# Patient Record
Sex: FEMALE | Race: BLACK OR AFRICAN AMERICAN | NOT HISPANIC OR LATINO | Employment: OTHER | URBAN - NONMETROPOLITAN AREA
[De-identification: names, ages, dates, MRNs, and addresses within clinical notes are randomized per-mention and may not be internally consistent; named-entity substitution may affect disease eponyms.]

---

## 2021-01-01 ENCOUNTER — TELEPHONE (OUTPATIENT)
Dept: FAMILY MEDICINE | Facility: CLINIC | Age: 86
End: 2021-01-01

## 2021-01-01 ENCOUNTER — OFFICE VISIT (OUTPATIENT)
Dept: FAMILY MEDICINE | Facility: CLINIC | Age: 86
End: 2021-01-01
Payer: MEDICARE

## 2021-01-01 ENCOUNTER — HOSPITAL ENCOUNTER (EMERGENCY)
Facility: HOSPITAL | Age: 86
Discharge: HOME | End: 2021-05-28
Payer: MEDICARE

## 2021-01-01 VITALS
SYSTOLIC BLOOD PRESSURE: 130 MMHG | RESPIRATION RATE: 18 BRPM | TEMPERATURE: 98 F | RESPIRATION RATE: 18 BRPM | SYSTOLIC BLOOD PRESSURE: 134 MMHG | HEIGHT: 64 IN | HEART RATE: 67 BPM | HEART RATE: 73 BPM | HEIGHT: 66 IN | DIASTOLIC BLOOD PRESSURE: 62 MMHG | WEIGHT: 180 LBS | DIASTOLIC BLOOD PRESSURE: 46 MMHG | OXYGEN SATURATION: 98 % | BODY MASS INDEX: 28.93 KG/M2 | OXYGEN SATURATION: 98 %

## 2021-01-01 DIAGNOSIS — M17.11 PRIMARY OSTEOARTHRITIS OF RIGHT KNEE: ICD-10-CM

## 2021-01-01 DIAGNOSIS — I10 HYPERTENSION: ICD-10-CM

## 2021-01-01 DIAGNOSIS — I10 HYPERTENSION, UNSPECIFIED TYPE: ICD-10-CM

## 2021-01-01 DIAGNOSIS — S89.91XA RIGHT KNEE INJURY: ICD-10-CM

## 2021-01-01 DIAGNOSIS — E78.5 HYPERLIPIDEMIA, UNSPECIFIED HYPERLIPIDEMIA TYPE: ICD-10-CM

## 2021-01-01 DIAGNOSIS — S89.91XA RIGHT LEG INJURY: ICD-10-CM

## 2021-01-01 DIAGNOSIS — L03.115 CELLULITIS OF RIGHT LOWER EXTREMITY: Primary | ICD-10-CM

## 2021-01-01 DIAGNOSIS — E78.5 HYPERLIPIDEMIA: ICD-10-CM

## 2021-01-01 DIAGNOSIS — M79.604 RIGHT LEG PAIN: ICD-10-CM

## 2021-01-01 DIAGNOSIS — S80.11XA CONTUSION OF RIGHT LOWER EXTREMITY, INITIAL ENCOUNTER: ICD-10-CM

## 2021-01-01 DIAGNOSIS — M17.11 PRIMARY OSTEOARTHRITIS OF RIGHT KNEE: Primary | ICD-10-CM

## 2021-01-01 LAB
ANION GAP SERPL CALCULATED.3IONS-SCNC: 13 MMOL/L (ref 7–16)
BASOPHILS # BLD AUTO: 0.06 K/UL (ref 0–0.2)
BASOPHILS NFR BLD AUTO: 1.1 % (ref 0–1)
BUN SERPL-MCNC: 22 MG/DL (ref 7–18)
BUN/CREAT SERPL: 20 (ref 6–20)
CALCIUM SERPL-MCNC: 8.6 MG/DL (ref 8.5–10.1)
CHLORIDE SERPL-SCNC: 110 MMOL/L (ref 98–107)
CO2 SERPL-SCNC: 26 MMOL/L (ref 21–32)
CREAT SERPL-MCNC: 1.08 MG/DL (ref 0.55–1.02)
DIFFERENTIAL METHOD BLD: ABNORMAL
EOSINOPHIL # BLD AUTO: 0.16 K/UL (ref 0–0.5)
EOSINOPHIL NFR BLD AUTO: 2.8 % (ref 1–4)
ERYTHROCYTE [DISTWIDTH] IN BLOOD BY AUTOMATED COUNT: 15.5 % (ref 11.5–14.5)
GLUCOSE SERPL-MCNC: 98 MG/DL (ref 74–106)
HCT VFR BLD AUTO: 31.9 % (ref 38–47)
HGB BLD-MCNC: 10.5 G/DL (ref 12–16)
IMM GRANULOCYTES # BLD AUTO: 0.03 K/UL (ref 0–0.04)
IMM GRANULOCYTES NFR BLD: 0.5 % (ref 0–0.4)
LYMPHOCYTES # BLD AUTO: 2.44 K/UL (ref 1–4.8)
LYMPHOCYTES NFR BLD AUTO: 43.3 % (ref 27–41)
MCH RBC QN AUTO: 29.5 PG (ref 27–31)
MCHC RBC AUTO-ENTMCNC: 32.9 G/DL (ref 32–36)
MCV RBC AUTO: 89.6 FL (ref 80–96)
MONOCYTES # BLD AUTO: 0.6 K/UL (ref 0–0.8)
MONOCYTES NFR BLD AUTO: 10.6 % (ref 2–6)
MPC BLD CALC-MCNC: 9.9 FL (ref 9.4–12.4)
NEUTROPHILS # BLD AUTO: 2.35 K/UL (ref 1.8–7.7)
NEUTROPHILS NFR BLD AUTO: 41.7 % (ref 53–65)
NRBC # BLD AUTO: 0 X10E3/UL
NRBC, AUTO (.00): 0 %
PLATELET # BLD AUTO: 293 K/UL (ref 150–400)
POTASSIUM SERPL-SCNC: 4.6 MMOL/L (ref 3.5–5.1)
RBC # BLD AUTO: 3.56 M/UL (ref 4.2–5.4)
SODIUM SERPL-SCNC: 144 MMOL/L (ref 136–145)
WBC # BLD AUTO: 5.64 K/UL (ref 4.5–11)

## 2021-01-01 PROCEDURE — 85025 COMPLETE CBC W/AUTO DIFF WBC: CPT | Performed by: NURSE PRACTITIONER

## 2021-01-01 PROCEDURE — 3288F PR FALLS RISK ASSESSMENT DOCUMENTED: ICD-10-PCS | Mod: CPTII,,, | Performed by: FAMILY MEDICINE

## 2021-01-01 PROCEDURE — 36415 COLL VENOUS BLD VENIPUNCTURE: CPT | Performed by: NURSE PRACTITIONER

## 2021-01-01 PROCEDURE — 80048 BASIC METABOLIC PNL TOTAL CA: CPT | Performed by: NURSE PRACTITIONER

## 2021-01-01 PROCEDURE — 99213 PR OFFICE/OUTPT VISIT, EST, LEVL III, 20-29 MIN: ICD-10-PCS | Mod: ,,, | Performed by: FAMILY MEDICINE

## 2021-01-01 PROCEDURE — 99213 OFFICE O/P EST LOW 20 MIN: CPT | Mod: ,,, | Performed by: FAMILY MEDICINE

## 2021-01-01 PROCEDURE — 1101F PT FALLS ASSESS-DOCD LE1/YR: CPT | Mod: CPTII,,, | Performed by: FAMILY MEDICINE

## 2021-01-01 PROCEDURE — 1101F PR PT FALLS ASSESS DOC 0-1 FALLS W/OUT INJ PAST YR: ICD-10-PCS | Mod: CPTII,,, | Performed by: FAMILY MEDICINE

## 2021-01-01 PROCEDURE — 99283 PR EMERGENCY DEPT VISIT,LEVEL III: ICD-10-PCS | Mod: ,,, | Performed by: NURSE PRACTITIONER

## 2021-01-01 PROCEDURE — 3288F FALL RISK ASSESSMENT DOCD: CPT | Mod: CPTII,,, | Performed by: FAMILY MEDICINE

## 2021-01-01 PROCEDURE — 1159F PR MEDICATION LIST DOCUMENTED IN MEDICAL RECORD: ICD-10-PCS | Mod: ,,, | Performed by: FAMILY MEDICINE

## 2021-01-01 PROCEDURE — 96365 THER/PROPH/DIAG IV INF INIT: CPT

## 2021-01-01 PROCEDURE — 99283 EMERGENCY DEPT VISIT LOW MDM: CPT | Mod: ,,, | Performed by: NURSE PRACTITIONER

## 2021-01-01 PROCEDURE — 99284 EMERGENCY DEPT VISIT MOD MDM: CPT | Mod: 25

## 2021-01-01 PROCEDURE — 25000003 PHARM REV CODE 250: Performed by: NURSE PRACTITIONER

## 2021-01-01 PROCEDURE — 1159F MED LIST DOCD IN RCRD: CPT | Mod: ,,, | Performed by: FAMILY MEDICINE

## 2021-01-01 RX ORDER — DICLOFENAC SODIUM 10 MG/G
2 GEL TOPICAL DAILY
Qty: 300 G | Refills: 1 | Status: SHIPPED | OUTPATIENT
Start: 2021-01-01

## 2021-01-01 RX ORDER — EZETIMIBE 10 MG/1
10 TABLET ORAL DAILY
COMMUNITY
Start: 2021-04-21

## 2021-01-01 RX ORDER — AMLODIPINE AND BENAZEPRIL HYDROCHLORIDE 5; 10 MG/1; MG/1
1 CAPSULE ORAL DAILY
COMMUNITY
Start: 2021-01-01

## 2021-01-01 RX ORDER — CLINDAMYCIN HYDROCHLORIDE 150 MG/1
300 CAPSULE ORAL EVERY 8 HOURS
Qty: 60 CAPSULE | Refills: 0 | Status: SHIPPED | OUTPATIENT
Start: 2021-01-01 | End: 2021-01-01

## 2021-01-01 RX ORDER — CLINDAMYCIN PHOSPHATE 900 MG/50ML
900 INJECTION, SOLUTION INTRAVENOUS
Status: COMPLETED | OUTPATIENT
Start: 2021-01-01 | End: 2021-01-01

## 2021-01-01 RX ADMIN — CLINDAMYCIN IN 5 PERCENT DEXTROSE 900 MG: 18 INJECTION, SOLUTION INTRAVENOUS at 11:05

## 2021-05-20 PROBLEM — E78.5 HYPERLIPIDEMIA: Status: ACTIVE | Noted: 2021-01-01

## 2021-05-20 PROBLEM — I10 HYPERTENSION: Status: ACTIVE | Noted: 2021-01-01

## 2021-05-20 PROBLEM — M17.11 PRIMARY OSTEOARTHRITIS OF RIGHT KNEE: Status: ACTIVE | Noted: 2021-01-01

## 2022-01-01 ENCOUNTER — OFFICE VISIT (OUTPATIENT)
Dept: WOUND CARE | Facility: CLINIC | Age: 87
End: 2022-01-01
Payer: MEDICARE

## 2022-01-01 ENCOUNTER — OFFICE VISIT (OUTPATIENT)
Dept: WOUND CARE | Facility: CLINIC | Age: 87
End: 2022-01-01
Attending: NURSE PRACTITIONER
Payer: MEDICARE

## 2022-01-01 VITALS
HEIGHT: 66 IN | TEMPERATURE: 97 F | DIASTOLIC BLOOD PRESSURE: 52 MMHG | HEART RATE: 75 BPM | BODY MASS INDEX: 27.97 KG/M2 | SYSTOLIC BLOOD PRESSURE: 106 MMHG | WEIGHT: 174 LBS | RESPIRATION RATE: 20 BRPM

## 2022-01-01 VITALS
RESPIRATION RATE: 20 BRPM | SYSTOLIC BLOOD PRESSURE: 120 MMHG | DIASTOLIC BLOOD PRESSURE: 53 MMHG | HEART RATE: 78 BPM | TEMPERATURE: 98 F

## 2022-01-01 DIAGNOSIS — L89.624 PRESSURE INJURY OF LEFT HEEL, STAGE 4: Primary | ICD-10-CM

## 2022-01-01 DIAGNOSIS — L89.212: ICD-10-CM

## 2022-01-01 DIAGNOSIS — L89.893 PRESSURE INJURY OF RIGHT LEG, STAGE 3: ICD-10-CM

## 2022-01-01 LAB
BACTERIA SPEC ANAEROBE CULT: ABNORMAL
MICROORGANISM SPEC CULT: ABNORMAL

## 2022-01-01 PROCEDURE — 87077 CULTURE, WOUND: ICD-10-PCS | Mod: GW,ICN,, | Performed by: CLINICAL MEDICAL LABORATORY

## 2022-01-01 PROCEDURE — 1159F MED LIST DOCD IN RCRD: CPT | Mod: CPTII,,, | Performed by: NURSE PRACTITIONER

## 2022-01-01 PROCEDURE — 1125F AMNT PAIN NOTED PAIN PRSNT: CPT | Mod: CPTII,,, | Performed by: NURSE PRACTITIONER

## 2022-01-01 PROCEDURE — 1160F RVW MEDS BY RX/DR IN RCRD: CPT | Mod: CPTII,,, | Performed by: NURSE PRACTITIONER

## 2022-01-01 PROCEDURE — 1125F PR PAIN SEVERITY QUANTIFIED, PAIN PRESENT: ICD-10-PCS | Mod: CPTII,,, | Performed by: NURSE PRACTITIONER

## 2022-01-01 PROCEDURE — 87070 CULTURE, WOUND: ICD-10-PCS | Mod: GW,ICN,, | Performed by: CLINICAL MEDICAL LABORATORY

## 2022-01-01 PROCEDURE — 87070 CULTURE OTHR SPECIMN AEROBIC: CPT | Mod: GW,ICN,, | Performed by: CLINICAL MEDICAL LABORATORY

## 2022-01-01 PROCEDURE — 1160F PR REVIEW ALL MEDS BY PRESCRIBER/CLIN PHARMACIST DOCUMENTED: ICD-10-PCS | Mod: CPTII,,, | Performed by: NURSE PRACTITIONER

## 2022-01-01 PROCEDURE — 87076 CULTURE ANAEROBE IDENT EACH: CPT | Mod: GW,ICN,, | Performed by: CLINICAL MEDICAL LABORATORY

## 2022-01-01 PROCEDURE — 99499 UNLISTED E&M SERVICE: CPT | Mod: S$PBB,,, | Performed by: NURSE PRACTITIONER

## 2022-01-01 PROCEDURE — 87186 CULTURE, WOUND: ICD-10-PCS | Performed by: CLINICAL MEDICAL LABORATORY

## 2022-01-01 PROCEDURE — 87075 CULTURE, ANAEROBE: ICD-10-PCS | Mod: GW,ICN,, | Performed by: CLINICAL MEDICAL LABORATORY

## 2022-01-01 PROCEDURE — 87076 CULTURE, ANAEROBE: ICD-10-PCS | Mod: GW,ICN,, | Performed by: CLINICAL MEDICAL LABORATORY

## 2022-01-01 PROCEDURE — 1159F PR MEDICATION LIST DOCUMENTED IN MEDICAL RECORD: ICD-10-PCS | Mod: CPTII,,, | Performed by: NURSE PRACTITIONER

## 2022-01-01 PROCEDURE — 99214 OFFICE O/P EST MOD 30 MIN: CPT | Mod: S$PBB,,, | Performed by: NURSE PRACTITIONER

## 2022-01-01 PROCEDURE — 11042 DBRDMT SUBQ TIS 1ST 20SQCM/<: CPT | Mod: S$PBB,GW,, | Performed by: NURSE PRACTITIONER

## 2022-01-01 PROCEDURE — 11042 PR DEBRIDEMENT, SKIN, SUB-Q TISSUE,=<20 SQ CM: ICD-10-PCS | Mod: S$PBB,GW,, | Performed by: NURSE PRACTITIONER

## 2022-01-01 PROCEDURE — 87077 CULTURE AEROBIC IDENTIFY: CPT | Mod: GW,ICN,, | Performed by: CLINICAL MEDICAL LABORATORY

## 2022-01-01 PROCEDURE — 99214 PR OFFICE/OUTPT VISIT, EST, LEVL IV, 30-39 MIN: ICD-10-PCS | Mod: S$PBB,,, | Performed by: NURSE PRACTITIONER

## 2022-01-01 PROCEDURE — 11042 DBRDMT SUBQ TIS 1ST 20SQCM/<: CPT | Mod: PBBFAC | Performed by: NURSE PRACTITIONER

## 2022-01-01 PROCEDURE — 99499 NO LOS: ICD-10-PCS | Mod: S$PBB,,, | Performed by: NURSE PRACTITIONER

## 2022-01-01 PROCEDURE — 99214 OFFICE O/P EST MOD 30 MIN: CPT | Mod: PBBFAC | Performed by: NURSE PRACTITIONER

## 2022-01-01 PROCEDURE — 87075 CULTR BACTERIA EXCEPT BLOOD: CPT | Mod: GW,ICN,, | Performed by: CLINICAL MEDICAL LABORATORY

## 2022-01-01 PROCEDURE — 87186 SC STD MICRODIL/AGAR DIL: CPT | Mod: GW,ICN,, | Performed by: CLINICAL MEDICAL LABORATORY

## 2022-01-01 RX ORDER — GUAIFENESIN/DEXTROMETHORPHAN 100-10MG/5
20 SYRUP ORAL EVERY 4 HOURS PRN
COMMUNITY
Start: 2022-01-01

## 2022-01-01 RX ORDER — SULFAMETHOXAZOLE AND TRIMETHOPRIM 800; 160 MG/1; MG/1
1 TABLET ORAL 2 TIMES DAILY
Qty: 28 TABLET | Refills: 0 | Status: SHIPPED | OUTPATIENT
Start: 2022-01-01

## 2022-01-01 RX ORDER — COLLAGENASE SANTYL 250 [ARB'U]/G
OINTMENT TOPICAL DAILY
Qty: 90 G | Refills: 0 | Status: SHIPPED | OUTPATIENT
Start: 2022-01-01

## 2022-01-01 RX ORDER — ASCORBIC ACID 1000 MG
30-60 TABLET ORAL DAILY PRN
COMMUNITY
Start: 2022-01-01

## 2022-01-01 RX ORDER — AMOXICILLIN 500 MG/1
500 TABLET, FILM COATED ORAL EVERY 12 HOURS
Qty: 28 TABLET | Refills: 0 | Status: SHIPPED | OUTPATIENT
Start: 2022-01-01 | End: 2022-01-01

## 2022-01-01 RX ORDER — HYDROCODONE BITARTRATE AND ACETAMINOPHEN 7.5; 325 MG/1; MG/1
1 TABLET ORAL EVERY 4 HOURS PRN
COMMUNITY
Start: 2022-01-01

## 2022-01-01 RX ORDER — SULFAMETHOXAZOLE AND TRIMETHOPRIM 800; 160 MG/1; MG/1
1 TABLET ORAL 2 TIMES DAILY
Qty: 20 TABLET | Refills: 0 | Status: SHIPPED | OUTPATIENT
Start: 2022-01-01 | End: 2022-01-01

## 2022-02-15 NOTE — PATIENT INSTRUCTIONS
Clean wound with baby shampoo and water.    Apply Santyl ointment to wound beds, sensicare to periwounds    Cover with 4x4s, ABDs, julissa to legs and left heel, secure with paper tape    Change daily and as needed    Right hip apply maxsorb, cover with foam border, changed every 2-3 days    Elevate legs on pillows or apply heel protectors    Turn every 2 hours per protocol    Bactrim DS by mouth twice a day # 24 tablets      Patient Education       Wound Care Discharge Instructions   About this topic   A wound is an injury to the skin that breaks the barrier to the outside. The skin protects the inside of the body from the outside world. When the skin is damaged or broken open, the wound can become infected or bleed.  Cuts and scrapes are one type of wound. You may also hear these called abrasions. Scrapes on the surface leave deeper skin layers in place. These are often caused by friction or rubbing against a rough surface.  Another kind is a puncture wound. This comes from something like a bite or stepping on a nail. Puncture wounds are caused by a pointed or sharp object, such as a nail, needle, or tooth entering the skin. Bleeding can be very little, and the wound may be barely obvious. Bites from a human or an animal always have germs in them and need extra care.  A laceration is a cut on your skin. It is most often caused by a sharp object like a knife blade, glass, or from other things with sharp edges. If the cut is shallow, it does not need stitches.     What care is needed at home?   · Ask your doctor what you need to do when you go home. Make sure you ask questions if you do not understand what the doctor says.  · The doctor may want you to keep your wound covered as it heals. You may want to use a thin layer of antibiotic ointment to help keep the wound moist. This will also keep the dressing from sticking to the wound.  · After 24 hours, you can gently wash the wound with soap and water. Pat dry and  put on a clean dressing. A telfa pad will not stick to the wound.  · Change your dressing once a day or every other day.  · Always wash your hands before and after touching the wound.  · Each time you change the dressing, look closely at the wound to be sure it is healing the right way. The wound may have a yellowish discharge, and this is normal.  · Avoid picking the scab or scratching the site which may cause more irritation.  · Do not soak in water or swim with an open wound.  · Do not rub the wound or take off any small strips of tape.  What follow-up care is needed?   Your doctor may ask you to make visits to the office to check on your progress. Be sure to keep these visits.  What drugs may be needed?   The doctor may order drugs to:  · Help with pain  · Prevent or fight an infection  You may need to have a tetanus shot.  Will physical activity be limited?   You may need to limit your activity until your wound is fully healed. Talk to your doctor about the right amount of activity for you or when it is safe to return to sports.  What problems could happen?   · Bleeding  · Infection  · Scarring  · Poor healing  When do I need to call the doctor?   · Signs of infection. These include a fever of 100.4°F (38°C) or higher, chills, or wound that will not heal.  · The pain in and around the area gets much worse.  · There is a bad smell or pus (thick yellow, green, or gray fluid) coming from your wound.  · You notice a crunchy feeling or blisters in the skin around the wound.  · The redness around your wound gets bigger or is spreading up your arm or leg.  · Fluid that is not pus drains from your wound.  · Your swelling doesnt improve or gets worse.  Teach Back: Helping You Understand   The Teach Back Method helps you understand the information we are giving you. After you talk with the staff, tell them in your own words what you learned. This helps to make sure the staff has described each thing clearly. It also  helps to explain things that may have been confusing. Before going home, make sure you can do these:  · I can tell you about my wound.  · I can tell you how to care for my wound.  · I can tell you what I will do if I have swelling, redness, or warmth around my wound.  Where can I learn more?   American Academy of Pediatrics  https://www.healthychildren.org/English/health-issues/injuries-emergencies/Pages/Treating-Cuts.aspx   [employer] [facility]  https://www.betterhealth.[place] maria.gov.au/health/conditionsandtreatments/skin-cuts-and-abrasions   Kids [employer]  http://kidshealth.org/en/teens/wounds.html?ref=search   Last Reviewed Date   2021-10-08  Consumer Information Use and Disclaimer   This information is not specific medical advice and does not replace information you receive from your health care provider. This is only a brief summary of general information. It does NOT include all information about conditions, illnesses, injuries, tests, procedures, treatments, therapies, discharge instructions or life-style choices that may apply to you. You must talk with your health care provider for complete information about your health and treatment options. This information should not be used to decide whether or not to accept your health care providers advice, instructions or recommendations. Only your health care provider has the knowledge and training to provide advice that is right for you.  Copyright   Copyright © 2021 [employer] and its affiliates and/or licensors. All rights reserved.

## 2022-02-15 NOTE — PROGRESS NOTES
[facility]   [facility] - WOUND CARE  [address] [facility] [id number]  [phone]      PATIENT NAME: Namrata Patino  : 1932  DATE: 2/15/22  MRN: [id number]      Billing Provider: LA[facility]  Level of Service:   Patient PCP Information     Provider PCP Type    [facility]          Reason for Visit / Chief Complaint: Pressure Ulcer (Left heel, right hip)       History of Present Illness / Problem Focused Workflow     Namrata Patino is a 89 y.o. female who presents to clinic as a new patient with complaints of chronic-non healing wounds on the left heel, right lateral and medial lower leg, and redness to hip and around breast. Patient is on Hospice and current treatment has been protective ointment and medihoney.  Pertinent factors that delay wound healing include immobility, multiple co-morbidities, poor vascular supply, decreased granulation tissue, necrosis, advanced age, fragile skin and no protective sensation. Wound on left heel has necrotic tissue and moist slough with strong odor noted from wound. Denies fever or chills.       Review of Systems     Review of Systems   Constitutional: Negative for activity change, chills and fever.   Respiratory: Negative for chest tightness and shortness of breath.    Cardiovascular: Negative for chest pain and palpitations.   Musculoskeletal: Positive for arthralgias and joint swelling.   Skin: Positive for wound.        See wound assessment   Neurological: Positive for weakness and numbness.   Psychiatric/Behavioral: Positive for confusion. Negative for agitation, behavioral problems and self-injury.       Medical / Social / Family History     Past Medical History:   Diagnosis Date    CVA (cerebral vascular accident)     several years ago    Hypertension     Loss of appetite        History reviewed. No pertinent surgical history.    Social History  Ms. Namrata Patino  reports  that she has quit smoking. Her smoking use included cigarettes. She has never used smokeless tobacco. She reports previous alcohol use. She reports that she does not use drugs.    Family History  Ms.'s Namrata Patino family history is not on file.    Medications and Allergies     Medications  No outpatient medications have been marked as taking for the 2/15/22 encounter (Office Visit) with [facility].       Allergies  Review of patient's allergies indicates:   Allergen Reactions    Keflex [cephalexin] Hives       Physical Examination     Vitals:    02/15/22 0925   BP: (!) 106/52   Pulse: 75   Resp: 20   Temp: 96.6 °F (35.9 °C)     Physical Exam  Vitals and nursing note reviewed.   Constitutional:       Appearance: Normal appearance.   HENT:      Head: Normocephalic.   Cardiovascular:      Rate and Rhythm: Normal rate and regular rhythm.      Pulses: Normal pulses.      Heart sounds: Normal heart sounds.   Pulmonary:      Effort: Pulmonary effort is normal. No respiratory distress.   Chest:      Chest wall: No tenderness.   Musculoskeletal:         General: Swelling and tenderness present.      Left lower leg: Edema present.   Skin:     General: Skin is warm and dry.      Capillary Refill: Capillary refill takes 2 to 3 seconds.      Findings: Erythema present.      Comments: See wound assessment    Neurological:      Mental Status: She is alert. Mental status is at baseline.   Psychiatric:         Mood and Affect: Mood normal.         Behavior: Behavior normal.         Thought Content: Thought content normal.         Judgment: Judgment normal.         Assessment and Plan      1. Pressure injury of left heel, stage 4    2. Pressure injury of right leg, stage 3    3. Pressure injury of trochanteric region of right hip, stage 2           Altered Skin Integrity 02/15/22 0958 Left Heel #1  (Active)   02/15/22 0958   Altered Skin Integrity Present on Admission: yes   Side: Left   Orientation:    Location:  Heel   Wound Number: #1   Is this injury device related?: No   Primary Wound Type:    Description of Altered Skin Integrity:    Removal Indication and Assessment:    Wound Outcome:    (Retired) Wound Length (cm):    (Retired) Wound Width (cm):    (Retired) Depth (cm):    Wound Description (Comments):    Removal Indications:    Description of Altered Skin Integrity Full thickness tissue loss with exposed bone, tendon, or muscle. Often includes undermining and tunneling. May extend into muscle and/or supporting structures. 02/15/22 0957   Dressing Appearance Dry;Intact;Dried drainage 02/15/22 0957   Drainage Amount Moderate 02/15/22 0957   Drainage Characteristics/Odor Yellow;Tan 02/15/22 0957   Appearance Pink;Red;Necrotic;Eschar;Slough;Granulating;Adipose;Muscle 02/15/22 0957   Black (%), Wound Tissue Color 5 % 02/15/22 0957   Red (%), Wound Tissue Color 50 % 02/15/22 0957   Yellow (%), Wound Tissue Color 45 % 02/15/22 0957   Periwound Area Edematous;Moist;Indurated 02/15/22 0957   Wound Edges Open 02/15/22 0957   Wound Length (cm) 3.2 cm 02/15/22 0957   Wound Width (cm) 1.8 cm 02/15/22 0957   Wound Depth (cm) 0.3 cm 02/15/22 0957   Wound Volume (cm^3) 1.728 cm^3 02/15/22 0957   Wound Surface Area (cm^2) 5.76 cm^2 02/15/22 0957   Care Cleansed with:;Antimicrobial agent 02/15/22 0957   Dressing Applied;Silver;Gauze;Rolled gauze 02/15/22 0957   Periwound Care Moisture barrier applied 02/15/22 0957            Altered Skin Integrity 02/15/22 1002 Right lower;medial Leg #2 (Active)   02/15/22 1002   Altered Skin Integrity Present on Admission: yes   Side: Right   Orientation: lower;medial   Location: Leg   Wound Number: #2   Is this injury device related?:    Primary Wound Type:    Description of Altered Skin Integrity:    Removal Indication and Assessment:    Wound Outcome:    (Retired) Wound Length (cm):    (Retired) Wound Width (cm):    (Retired) Depth (cm):    Wound Description (Comments):    Removal Indications:     Wound Image    02/15/22 1001   Description of Altered Skin Integrity Full thickness tissue loss. Subcutaneous fat may be visible but bone, tendon or muscle are not exposed 02/15/22 1001   Dressing Appearance Dry;Intact;Clean 02/15/22 1001   Drainage Amount Moderate 02/15/22 1001   Drainage Characteristics/Odor Yellow 02/15/22 1001   Appearance Intact;Pink;Slough;Moist 02/15/22 1001   Black (%), Wound Tissue Color 0 % 02/15/22 1001   Red (%), Wound Tissue Color 90 % 02/15/22 1001   Yellow (%), Wound Tissue Color 10 % 02/15/22 1001   Periwound Area Intact;[place]illager 02/15/22 1001   Wound Edges Defined 02/15/22 1001   Wound Length (cm) 7 cm 02/15/22 1001   Wound Width (cm) 2 cm 02/15/22 1001   Wound Depth (cm) 0.3 cm 02/15/22 1001   Wound Volume (cm^3) 4.2 cm^3 02/15/22 1001   Wound Surface Area (cm^2) 14 cm^2 02/15/22 1001   Care Cleansed with:;Antimicrobial agent 02/15/22 1001   Dressing Applied;Silver;Gauze;Rolled gauze 02/15/22 1001            Altered Skin Integrity 02/15/22 1006 Right lower;medial Leg #3  Full thickness tissue loss. Subcutaneous fat may be visible but bone, tendon or muscle are not exposed (Active)   02/15/22 1006   Altered Skin Integrity Present on Admission: yes   Side: Right   Orientation: lower;medial   Location: Leg   Wound Number: #3   Is this injury device related?: No   Primary Wound Type:    Description of Altered Skin Integrity: Full thickness tissue loss. Subcutaneous fat may be visible but bone, tendon or muscle are not exposed   Removal Indication and Assessment:    Wound Outcome:    (Retired) Wound Length (cm):    (Retired) Wound Width (cm):    (Retired) Depth (cm):    Wound Description (Comments):    Removal Indications:    Description of Altered Skin Integrity Full thickness tissue loss. Subcutaneous fat may be visible but bone, tendon or muscle are not exposed 02/15/22 1006   Dressing Appearance Dry;Intact;Clean 02/15/22 1006   Drainage Amount Moderate 02/15/22 1006   Drainage  Characteristics/Odor Yellow 02/15/22 1006   Appearance Intact;Pink;Red;Slough 02/15/22 1006   Black (%), Wound Tissue Color 0 % 02/15/22 1006   Red (%), Wound Tissue Color 85 % 02/15/22 1006   Yellow (%), Wound Tissue Color 15 % 02/15/22 1006   Periwound Area Intact;[place] 02/15/22 1006   Wound Edges Defined 02/15/22 1006   Wound Length (cm) 1.9 cm 02/15/22 1006   Wound Width (cm) 0.7 cm 02/15/22 1006   Wound Depth (cm) 0.2 cm 02/15/22 1006   Wound Volume (cm^3) 0.266 cm^3 02/15/22 1006   Wound Surface Area (cm^2) 1.33 cm^2 02/15/22 1006   Care Cleansed with:;Antimicrobial agent 02/15/22 1006   Dressing Applied;Silver;Gauze;Rolled gauze 02/15/22 1006   Periwound Care Moisture barrier applied 02/15/22 1006            Altered Skin Integrity 02/15/22 1011 Right Greater trochanter #4  Partial thickness tissue loss. Shallow open ulcer with a red or pink wound bed, without slough. Intact or Open/Ruptured Serum-filled blister. (Active)   02/15/22 1011   Altered Skin Integrity Present on Admission: yes   Side: Right   Orientation:    Location: Greater trochanter   Wound Number: #4   Is this injury device related?: No   Primary Wound Type:    Description of Altered Skin Integrity: Partial thickness tissue loss. Shallow open ulcer with a red or pink wound bed, without slough. Intact or Open/Ruptured Serum-filled blister.   Removal Indication and Assessment:    Wound Outcome:    (Retired) Wound Length (cm):    (Retired) Wound Width (cm):    (Retired) Depth (cm):    Wound Description (Comments):    Removal Indications:    Description of Altered Skin Integrity Partial thickness tissue loss. Shallow open ulcer with a red or pink wound bed, without slough. Intact or Open/Ruptured Serum-filled blister. 02/15/22 1006   Dressing Appearance Dry;Intact;Clean 02/15/22 1006   Drainage Amount Scant 02/15/22 1006   Drainage Characteristics/Odor Yellow 02/15/22 1006   Appearance Intact;Pink;Slough;Granulating 02/15/22 1006   Black (%), Wound  Tissue Color 0 % 02/15/22 1006   Red (%), Wound Tissue Color 70 % 02/15/22 1006   Yellow (%), Wound Tissue Color 30 % 02/15/22 1006   Periwound Area Intact 02/15/22 1006   Wound Edges Defined 02/15/22 1006   Wound Length (cm) 0.3 cm 02/15/22 1006   Wound Width (cm) 0.3 cm 02/15/22 1006   Wound Depth (cm) 0.2 cm 02/15/22 1006   Wound Volume (cm^3) 0.018 cm^3 02/15/22 1006   Wound Surface Area (cm^2) 0.09 cm^2 02/15/22 1006   Care Antimicrobial agent 02/15/22 1006   Dressing Applied;Silver;Island/border 02/15/22 1006         Active Problem List with Overview Notes    Diagnosis Date Noted    Hypertension 05/20/2021    Primary osteoarthritis of right knee 05/20/2021    Hyperlipidemia 05/20/2021         :    Plan:   Wound Treatment: Clean wounds with dakin's solution. Apply santyl to wound bed and cover with saline moistened 4x4s. Continue protective barrier to reddened areas.   You may remove all bandages and shower prior to dressing changes. Bathe with a mild soap such as Dove. Do not sure antibacterial soaps such as Dial. Irrigate the wound with tepid water for 5 minutes. Dry thoroughly.  Do not take tub baths or soak in a Jacuzzi or swimming pool.  If you are unable to shower, you may use wound  or saline wash to cleanse the wound.  Elevated glucose will interfere with the healing process. Carbohydrates should be consumed sparingly and avoid anything with white flour, white sugar, white rice, white pasta, and white bread. Use whole wheat whole grain products instead.  If you are on steroids or immunosuppressants, understand that this will delay the healing process.  Monitor for signs and symptoms of infection including fever, chills, purulent drainage, increased pain, swelling, or redness and notify clinic at [phone]  Lifestyle Modifications: reduce salt in diet and cooking, improve dietary compliance and continue current medications  Encouraged patient to eat diet high in protein.   Follow up in 2  weeks      Problem List Items Addressed This Visit    None     Visit Diagnoses     Pressure injury of left heel, stage 4    -  Primary    Relevant Orders    Culture, Anaerobe    Culture, Wound    Pressure injury of right leg, stage 3        Pressure injury of trochanteric region of right hip, stage 2              Future Appointments   Date Time Provider Department Center   3/1/2022  9:00 AM ROOM 1, [facility] WOUND [facility]            Signature:  [facility]  [facility] - WOUND CARE  [address] [facility] [address]  [phone]    Date of encounter: 2/15/22

## 2022-02-15 NOTE — PLAN OF CARE
[x]     Assess wound(s) each visit    [x]     Provide wound care    [x]     Perform vascular assessment   [x]     Assess response to wound care      [x]     Assess for malnutrition, specifically, chronic causes of protein-energy malnutrition such as Diabetes, immobility, alcoholism/substance abuse, Morbid Obesity, COPD, infection, and increased age.    [x]     Review pertinent labs to evaluate renal function and nutritional status.    [x]     Obtain patient weight.    [x]     Obtain Nutrition Consult based upon nutrition risk assessment protocols.    [x]     Promote sufficient fluid and calories to promote wound healing.    [x]     Recommend vitamin and mineral supplements as needed.   [x]     Exercise as tolerated or progressive resistance activity.       [x]     Provide antibiotics as ordered. Provide systemic antibiotics immediately in patients with critical limb ischemia, have evidence of limb infection or cellulitis, and/or infected wounds.   [x]     Use aseptic techniques in the provision of wound care.   [x]     Consult Infectious Disease specialist as needed.    [x]     Provide debridement type based upon vascular status and wound assessment to remove necrotic tissue and decrease bioburden.   [x]     Follow Universal Precautions and Infection Control [place] to prevent the cross-contamination of organisms.

## 2022-02-28 NOTE — PATIENT INSTRUCTIONS
Clean wound with baby shampoo and water    Apply sensicare to  wound edges    Apply santyl ointment to wound (nickel thick) cover with saline moisten gauze,cover with dry gauze,ABD,paper tape,change daily and as needed    Elevate feet with pillows or wear heel protectors    Right hip apply maxsorb,cover with foam border,change every 2-3 days and as needed    Turn frequently    Cont Bactrim and amoxicillin as prescribed

## 2022-03-03 PROBLEM — L89.624 PRESSURE INJURY OF LEFT HEEL, STAGE 4: Status: ACTIVE | Noted: 2022-01-01

## 2022-03-03 PROBLEM — L89.893: Status: ACTIVE | Noted: 2022-01-01

## 2022-03-03 NOTE — PROGRESS NOTES
LAKE Leigh   [facility] - WOUND CARE  [address] [facility] [address]  [phone]      PATIENT NAME: Namrata Patino  : 1932  DATE: 3/1/22  MRN: [id number]      Billing Provider: LA[facility] Leigh  Level of Service:   Patient PCP Information     Provider PCP Type    [facility]          Reason for Visit / Chief Complaint: Non-healing Wound Follow Up and Pressure Ulcer (Right leg and left heel)       History of Present Illness / Problem Focused Workflow     Namrata Patino is a is a 89 y.o. female who presents for follow up of chronic-non healing wounds on the left heel, right lateral and medial lower leg, and redness to hip and around breast. Patient is on Hospice. Pertinent factors that delay wound healing include immobility, multiple co-morbidities, poor vascular supply, decreased granulation tissue, necrosis, advanced age, fragile skin and no protective sensation. Wound on left heel has improved since last visit, right lower leg continues to have moderate amount of slough and necrotic tissue, tendon is exposed. Denies fever or chills.       Review of Systems     Review of Systems   Constitutional: Negative for activity change, chills and fever.   Respiratory: Negative for chest tightness and shortness of breath.    Cardiovascular: Positive for leg swelling. Negative for chest pain and palpitations.   Musculoskeletal: Positive for arthralgias, gait problem and joint swelling.   Skin: Positive for wound.        See wound assessment   Neurological: Positive for weakness and numbness.   Psychiatric/Behavioral: Positive for confusion. Negative for agitation, behavioral problems and self-injury.       Medical / Social / Family History     Past Medical History:   Diagnosis Date    CVA (cerebral vascular accident)     several years ago    Hypertension     Loss of appetite        History reviewed. No pertinent surgical history.    Social  History  Ms. Namrata Patino  reports that she has quit smoking. Her smoking use included cigarettes. She has never used smokeless tobacco. She reports previous alcohol use. She reports that she does not use drugs.    Family History  Ms.'s Namrata Patino family history is not on file.    Medications and Allergies     Medications  Outpatient Medications Marked as Taking for the 3/1/22 encounter (Office Visit) with LA[facility]   Medication Sig Dispense Refill    amlodipine-benazepril 5-10 mg (LOTREL) 5-10 mg per capsule Take 1 capsule by mouth once daily.      collagenase (SANTYL) ointment Apply topically once daily. Apply to wounds daily 90 g 0    ezetimibe (ZETIA) 10 mg tablet Take 10 mg by mouth once daily.      HYDROcodone-acetaminophen (NORCO) 7.5-325 mg per tablet Take 1 tablet by mouth every 4 (four) hours as needed for Pain.      MILK OF MAGNESIA 400 mg/5 mL suspension Take 30-60 mLs by mouth daily as needed for Constipation.         Allergies  Review of patient's allergies indicates:   Allergen Reactions    Keflex [cephalexin] Hives       Physical Examination     Vitals:    03/01/22 0929   BP: (!) 120/53   Pulse: 78   Resp: 20   Temp: 97.7 °F (36.5 °C)     Physical Exam  Vitals and nursing note reviewed.   Constitutional:       Appearance: Normal appearance.   HENT:      Head: Normocephalic.   Cardiovascular:      Rate and Rhythm: Normal rate and regular rhythm.   Pulmonary:      Effort: Pulmonary effort is normal. No respiratory distress.   Chest:      Chest wall: No tenderness.   Musculoskeletal:         General: Swelling and tenderness present.      Right lower leg: Edema present.      Left lower leg: Edema present.   Skin:     General: Skin is warm and dry.      Capillary Refill: Capillary refill takes 2 to 3 seconds.      Comments: See wound assessment    Neurological:      Mental Status: She is alert. Mental status is at baseline.   Psychiatric:         Mood and Affect: Mood  normal.         Behavior: Behavior normal.         Thought Content: Thought content normal.         Judgment: Judgment normal.         Assessment and Plan      1. Pressure injury of left heel, stage 4    2. Pressure injury of right leg, stage 3    3. Pressure injury of trochanteric region of right hip, stage 2           Altered Skin Integrity 02/15/22 0958 Left Heel #1  (Active)   02/15/22 0958   Altered Skin Integrity Present on Admission: yes   Side: Left   Orientation:    Location: Heel   Wound Number: #1   Is this injury device related?: No   Primary Wound Type:    Description of Altered Skin Integrity:    Removal Indication and Assessment:    Wound Outcome:    (Retired) Wound Length (cm):    (Retired) Wound Width (cm):    (Retired) Depth (cm):    Wound Description (Comments):    Removal Indications:    Wound Image    03/01/22 0933   Description of Altered Skin Integrity Full thickness tissue loss. Subcutaneous fat may be visible but bone, tendon or muscle are not exposed 03/01/22 0933   Dressing Appearance Moist drainage;Intact 03/01/22 0933   Drainage Amount Moderate 03/01/22 0933   Drainage Characteristics/Odor Serosanguineous 03/01/22 0933   Appearance Pink;Moist;Granulating 03/01/22 0933   Tissue loss description Full thickness 03/01/22 0933   Black (%), Wound Tissue Color 0 % 03/01/22 0933   Red (%), Wound Tissue Color 100 % 03/01/22 0933   Yellow (%), Wound Tissue Color 0 % 03/01/22 0933   Periwound Area Intact;Dry 03/01/22 0933   Wound Edges Open 03/01/22 0933   Wound Length (cm) 2.2 cm 03/01/22 0933   Wound Width (cm) 3.4 cm 03/01/22 0933   Wound Depth (cm) 1 cm 03/01/22 0933   Wound Volume (cm^3) 7.48 cm^3 03/01/22 0933   Wound Surface Area (cm^2) 7.48 cm^2 03/01/22 0933   Care Cleansed with:;Antimicrobial agent 03/01/22 0933   Dressing Applied;Gauze, wet to dry;Gauze;Rolled gauze 03/01/22 0933   Periwound Care Moisture barrier applied 03/01/22 0933   Off Loading Other (see comments) 03/01/22 0933             Altered Skin Integrity 02/15/22 1002 Right lower;medial Leg #2 (Active)   02/15/22 1002   Altered Skin Integrity Present on Admission: yes   Side: Right   Orientation: lower;medial   Location: Leg   Wound Number: #2   Is this injury device related?:    Primary Wound Type:    Description of Altered Skin Integrity:    Removal Indication and Assessment:    Wound Outcome:    (Retired) Wound Length (cm):    (Retired) Wound Width (cm):    (Retired) Depth (cm):    Wound Description (Comments):    Removal Indications:    Wound Image    03/01/22 0943   Description of Altered Skin Integrity Partial thickness tissue loss. Shallow open ulcer with a red or pink wound bed, without slough. Intact or Open/Ruptured Serum-filled blister. 03/01/22 0943   Dressing Appearance Dry;Intact;Dried drainage 03/01/22 0943   Drainage Amount Scant 03/01/22 0943   Drainage Characteristics/Odor Serosanguineous 03/01/22 0943   Appearance Pink;Moist;Epithelialization 03/01/22 0943   Tissue loss description Partial thickness 03/01/22 0943   Black (%), Wound Tissue Color 0 % 03/01/22 0943   Red (%), Wound Tissue Color 100 % 03/01/22 0943   Yellow (%), Wound Tissue Color 0 % 03/01/22 0943   Periwound Area Intact;Dry 03/01/22 0943   Wound Edges Irregular 03/01/22 0943   Wound Length (cm) 4 cm 03/01/22 0943   Wound Width (cm) 0.4 cm 03/01/22 0943   Wound Depth (cm) 0.2 cm 03/01/22 0943   Wound Volume (cm^3) 0.32 cm^3 03/01/22 0943   Wound Surface Area (cm^2) 1.6 cm^2 03/01/22 0943   Care Cleansed with:;Antimicrobial agent 03/01/22 0943   Dressing Applied;Gauze, wet to dry;Gauze;Rolled gauze 03/01/22 0943   Packing Inserted  0 03/01/22 0943   Packing Removed 0 03/01/22 0943   Dressing Change Due 03/02/22 03/01/22 0943            Altered Skin Integrity 02/15/22 1006 Right lower;lateral Leg #3  Full thickness tissue loss. Subcutaneous fat may be visible but bone, tendon or muscle are not exposed (Active)   02/15/22 1006   Altered Skin Integrity  Present on Admission: yes   Side: Right   Orientation: lower;lateral   Location: Leg   Wound Number: #3   Is this injury device related?: No   Primary Wound Type:    Description of Altered Skin Integrity: Full thickness tissue loss. Subcutaneous fat may be visible but bone, tendon or muscle are not exposed   Removal Indication and Assessment:    Wound Outcome:    (Retired) Wound Length (cm):    (Retired) Wound Width (cm):    (Retired) Depth (cm):    Wound Description (Comments):    Removal Indications:    Wound Image    03/01/22 0945   Description of Altered Skin Integrity Full thickness tissue loss with exposed bone, tendon, or muscle. Often includes undermining and tunneling. May extend into muscle and/or supporting structures. 03/01/22 0945   Dressing Appearance Intact;Moist drainage 03/01/22 0945   Drainage Amount Moderate 03/01/22 0945   Drainage Characteristics/Odor Serous 03/01/22 0945   Appearance Pink;Tan;Slough;Moist;Tendon 03/01/22 0945   Tissue loss description Full thickness 03/01/22 0945   Black (%), Wound Tissue Color 10 % 03/01/22 0945   Red (%), Wound Tissue Color 10 % 03/01/22 0945   Yellow (%), Wound Tissue Color 80 % 03/01/22 0945   Periwound Area Moist 03/01/22 0945   Wound Edges Open 03/01/22 0945   Wound Length (cm) 6.5 cm 03/01/22 0945   Wound Width (cm) 2.6 cm 03/01/22 0945   Wound Depth (cm) 1 cm 03/01/22 0945   Wound Volume (cm^3) 16.9 cm^3 03/01/22 0945   Wound Surface Area (cm^2) 16.9 cm^2 03/01/22 0945   Care Cleansed with:;Antimicrobial agent 03/01/22 0945   Dressing Applied;Gauze, wet to dry;Gauze;Rolled gauze 03/01/22 0945   Dressing Change Due 03/02/22 03/01/22 0945         Active Problem List with Overview Notes    Diagnosis Date Noted    Pressure injury of left heel, stage 4 03/03/2022    Pressure injury of right leg, stage 3 03/03/2022    Hypertension 05/20/2021    Primary osteoarthritis of right knee 05/20/2021    Hyperlipidemia 05/20/2021         :    Plan:   Continue  current treatment plan. Apply mepitel to protect and keep exposed tendon moist.   Antibiotics as prescribed.   F/u in 2 weeks.     Problem List Items Addressed This Visit        Other    Pressure injury of left heel, stage 4 - Primary    Pressure injury of right leg, stage 3      Other Visit Diagnoses     Pressure injury of trochanteric region of right hip, stage 2              Future Appointments   Date Time Provider Department Center   3/15/2022  9:00 AM [facility]            Signature:  LAKE Leigh  [facility] - WOUND CARE  [address] [address]  [phone]    Date of encounter: 3/1/22

## 2022-10-19 NOTE — PROGRESS NOTES
Debridement Performed for Assessment: Wound# 1  Performed By: Provider: Peyton Toribio NP  Assistant: CARLOS Pang RN    Debridement: Surgical    Photo taken post procedure: Yes    Time-Out Taken: Yes  Level: Skin/Subcutaneous Tissue  Post Debridement Measurements  Length: (cm) 3.5  Width: (cm) 2  Depth: (cm) 0.5      Area: (cm²) 7  Percent Debrided: 100%  Total Area Debrided: (sq cm) 7    Tissue and other material debrided:  Adipose, Dermis, Epidermis, Subcutaneous  Devitalized Tissue Debrided:Biofilm, Slough, Eschar  Instrument: Curette  Bleeding: Moderate  Hemostasis Achieved: Pressure  Procedural Pain: Insensate  Post Procedural Pain: Insensate  Response to Treatment: Procedure was tolerated well    Devitalized materials/tissue Removed  the following was removed during debridement  subcutaneous      Post Debridement Diagnosis  Post debridement diagnosis  Same as Pre-operative debridement diagnosis, No Complications noted.      Grafts or implants applied  Was a graft or implant applied?  No      Procedure assistant  Procedure assisted by:  Assistant is the same as nurse listed above      Complications related to procedure  Did any complication occure during procedure?  No complications noted during or after procedure.      Specimen  Specimen collect during procedure?  No specimen collected      Anaesthesia:  Anesthesia used  None      Blood Loss:  Blood loss during procedure  less than 5 cc